# Patient Record
Sex: MALE | URBAN - METROPOLITAN AREA
[De-identification: names, ages, dates, MRNs, and addresses within clinical notes are randomized per-mention and may not be internally consistent; named-entity substitution may affect disease eponyms.]

---

## 2017-03-09 ENCOUNTER — TELEPHONE (OUTPATIENT)
Dept: UROLOGY | Facility: CLINIC | Age: 42
End: 2017-03-09

## 2017-03-09 NOTE — TELEPHONE ENCOUNTER
Byeong Lee contacted Urology triage today requesting a free SA after his vasectomy from 2011. Patient very adamant that this analysis be free as it should be covered as post operative care after vasectomy. Offered to put in an order for an SA but patient did not want to schedule this without knowing that it is going to be free. Informed patient that writer is unable to make sure this is covered by insurance. Patient stated that his insurance company told him that after a vasectomy, the clinic should offer a couple SAs that are covered completely (free).   Patient's physician: Dr. Louise  Plan: Gave patient financial information to contact.    Sofi Morgan LPN  03/09/17  5:06 PM